# Patient Record
Sex: MALE | Race: WHITE | NOT HISPANIC OR LATINO | Employment: FULL TIME | URBAN - METROPOLITAN AREA
[De-identification: names, ages, dates, MRNs, and addresses within clinical notes are randomized per-mention and may not be internally consistent; named-entity substitution may affect disease eponyms.]

---

## 2018-09-19 ENCOUNTER — HOSPITAL ENCOUNTER (EMERGENCY)
Facility: HOSPITAL | Age: 23
Discharge: HOME | End: 2018-09-19
Attending: EMERGENCY MEDICINE
Payer: COMMERCIAL

## 2018-09-19 VITALS
OXYGEN SATURATION: 100 % | RESPIRATION RATE: 13 BRPM | WEIGHT: 198.31 LBS | DIASTOLIC BLOOD PRESSURE: 83 MMHG | BODY MASS INDEX: 30.05 KG/M2 | HEART RATE: 61 BPM | HEIGHT: 68 IN | SYSTOLIC BLOOD PRESSURE: 137 MMHG | TEMPERATURE: 99 F

## 2018-09-19 DIAGNOSIS — N30.01 ACUTE CYSTITIS WITH HEMATURIA: Primary | ICD-10-CM

## 2018-09-19 DIAGNOSIS — E87.6 HYPOKALEMIA: ICD-10-CM

## 2018-09-19 DIAGNOSIS — R10.9 RIGHT FLANK PAIN: ICD-10-CM

## 2018-09-19 DIAGNOSIS — N23 RENAL COLIC: ICD-10-CM

## 2018-09-19 DIAGNOSIS — N20.1 URETERAL CALCULUS, RIGHT: ICD-10-CM

## 2018-09-19 LAB
ALBUMIN SERPL BCP-MCNC: 4.1 G/DL
ALP SERPL-CCNC: 55 U/L
ALT SERPL W/O P-5'-P-CCNC: 40 U/L
AMORPH CRY UR QL COMP ASSIST: ABNORMAL
ANION GAP SERPL CALC-SCNC: 10 MMOL/L
AST SERPL-CCNC: 49 U/L
BACTERIA #/AREA URNS AUTO: ABNORMAL /HPF
BASOPHILS # BLD AUTO: 0.02 K/UL
BASOPHILS NFR BLD: 0.4 %
BILIRUB SERPL-MCNC: 0.7 MG/DL
BILIRUB UR QL STRIP: NEGATIVE
BUN SERPL-MCNC: 12 MG/DL
CALCIUM SERPL-MCNC: 9 MG/DL
CHLORIDE SERPL-SCNC: 108 MMOL/L
CLARITY UR REFRACT.AUTO: CLEAR
CO2 SERPL-SCNC: 27 MMOL/L
COLOR UR AUTO: YELLOW
CREAT SERPL-MCNC: 0.8 MG/DL
DIFFERENTIAL METHOD: NORMAL
EOSINOPHIL # BLD AUTO: 0.2 K/UL
EOSINOPHIL NFR BLD: 4.1 %
ERYTHROCYTE [DISTWIDTH] IN BLOOD BY AUTOMATED COUNT: 12.7 %
EST. GFR  (AFRICAN AMERICAN): >60 ML/MIN/1.73 M^2
EST. GFR  (NON AFRICAN AMERICAN): >60 ML/MIN/1.73 M^2
GLUCOSE SERPL-MCNC: 108 MG/DL
GLUCOSE UR QL STRIP: NEGATIVE
HCT VFR BLD AUTO: 42.8 %
HGB BLD-MCNC: 14.9 G/DL
HGB UR QL STRIP: ABNORMAL
KETONES UR QL STRIP: NEGATIVE
LEUKOCYTE ESTERASE UR QL STRIP: NEGATIVE
LYMPHOCYTES # BLD AUTO: 2 K/UL
LYMPHOCYTES NFR BLD: 36.5 %
MCH RBC QN AUTO: 30.1 PG
MCHC RBC AUTO-ENTMCNC: 34.8 G/DL
MCV RBC AUTO: 87 FL
MICROSCOPIC COMMENT: ABNORMAL
MONOCYTES # BLD AUTO: 0.7 K/UL
MONOCYTES NFR BLD: 12.5 %
NEUTROPHILS # BLD AUTO: 2.6 K/UL
NEUTROPHILS NFR BLD: 46.3 %
NITRITE UR QL STRIP: NEGATIVE
PH UR STRIP: 7 [PH] (ref 5–8)
PLATELET # BLD AUTO: 218 K/UL
PMV BLD AUTO: 11 FL
POTASSIUM SERPL-SCNC: 3.1 MMOL/L
PROT SERPL-MCNC: 7.2 G/DL
PROT UR QL STRIP: ABNORMAL
RBC # BLD AUTO: 4.95 M/UL
RBC #/AREA URNS AUTO: 25 /HPF (ref 0–4)
SODIUM SERPL-SCNC: 145 MMOL/L
SP GR UR STRIP: 1.02 (ref 1–1.03)
SQUAMOUS #/AREA URNS AUTO: 0 /HPF
URN SPEC COLLECT METH UR: ABNORMAL
UROBILINOGEN UR STRIP-ACNC: ABNORMAL EU/DL
WBC # BLD AUTO: 5.59 K/UL
WBC #/AREA URNS AUTO: 1 /HPF (ref 0–5)
YEAST UR QL AUTO: ABNORMAL

## 2018-09-19 PROCEDURE — 81000 URINALYSIS NONAUTO W/SCOPE: CPT

## 2018-09-19 PROCEDURE — 85025 COMPLETE CBC W/AUTO DIFF WBC: CPT

## 2018-09-19 PROCEDURE — 63600175 PHARM REV CODE 636 W HCPCS: Performed by: NURSE PRACTITIONER

## 2018-09-19 PROCEDURE — 25000003 PHARM REV CODE 250: Performed by: NURSE PRACTITIONER

## 2018-09-19 PROCEDURE — 96374 THER/PROPH/DIAG INJ IV PUSH: CPT

## 2018-09-19 PROCEDURE — 99284 EMERGENCY DEPT VISIT MOD MDM: CPT | Mod: 25

## 2018-09-19 PROCEDURE — 96361 HYDRATE IV INFUSION ADD-ON: CPT

## 2018-09-19 PROCEDURE — 80053 COMPREHEN METABOLIC PANEL: CPT

## 2018-09-19 RX ORDER — TAMSULOSIN HYDROCHLORIDE 0.4 MG/1
0.4 CAPSULE ORAL DAILY
Qty: 10 CAPSULE | Refills: 0 | Status: SHIPPED | OUTPATIENT
Start: 2018-09-19 | End: 2018-09-29

## 2018-09-19 RX ORDER — PREDNISONE 20 MG/1
TABLET ORAL
Qty: 18 TABLET | Refills: 0 | Status: SHIPPED | OUTPATIENT
Start: 2018-09-19

## 2018-09-19 RX ORDER — POTASSIUM CHLORIDE 20 MEQ/1
20 TABLET, EXTENDED RELEASE ORAL
Status: COMPLETED | OUTPATIENT
Start: 2018-09-19 | End: 2018-09-19

## 2018-09-19 RX ORDER — CIPROFLOXACIN 500 MG/1
500 TABLET ORAL
Status: COMPLETED | OUTPATIENT
Start: 2018-09-19 | End: 2018-09-19

## 2018-09-19 RX ORDER — CIPROFLOXACIN 500 MG/1
500 TABLET ORAL 2 TIMES DAILY
Qty: 20 TABLET | Refills: 0 | Status: SHIPPED | OUTPATIENT
Start: 2018-09-19 | End: 2018-09-29

## 2018-09-19 RX ORDER — HYDROCODONE BITARTRATE AND ACETAMINOPHEN 7.5; 325 MG/1; MG/1
1 TABLET ORAL EVERY 6 HOURS PRN
Qty: 15 TABLET | Refills: 0 | Status: SHIPPED | OUTPATIENT
Start: 2018-09-19

## 2018-09-19 RX ORDER — KETOROLAC TROMETHAMINE 30 MG/ML
30 INJECTION, SOLUTION INTRAMUSCULAR; INTRAVENOUS
Status: COMPLETED | OUTPATIENT
Start: 2018-09-19 | End: 2018-09-19

## 2018-09-19 RX ADMIN — CIPROFLOXACIN 500 MG: 500 TABLET, FILM COATED ORAL at 09:09

## 2018-09-19 RX ADMIN — KETOROLAC TROMETHAMINE 30 MG: 30 INJECTION INTRAMUSCULAR; INTRAVENOUS at 08:09

## 2018-09-19 RX ADMIN — POTASSIUM CHLORIDE 20 MEQ: 1500 TABLET, EXTENDED RELEASE ORAL at 09:09

## 2018-09-19 RX ADMIN — SODIUM CHLORIDE 1000 ML: 0.9 INJECTION, SOLUTION INTRAVENOUS at 08:09

## 2018-09-20 NOTE — ED PROVIDER NOTES
Encounter Date: 9/19/2018       History     Chief Complaint   Patient presents with    Flank Pain     c/o right flank pain.sudden onsest 1 hour ago. denies difficulty urinating. hx of stones.        The history is provided by the patient.   Abdominal Pain   The current episode started 1 to 2 hours ago. The onset of the illness was abrupt. The problem has not changed since onset.The abdominal pain is located in the right flank. The abdominal pain does not radiate. The severity of the abdominal pain is 8/10. The abdominal pain is exacerbated by urination. The other symptoms of the illness do not include fever, fatigue, shortness of breath, nausea, vomiting, diarrhea or dysuria.   Associated with: patient has history of kidney stones. The patient has not had a change in bowel habit. Symptoms associated with the illness do not include chills, diaphoresis, heartburn or back pain. Significant associated medical issues do not include GERD, diabetes or gallstones.   Mr. Corona reports having a history of kidney stones.  He states that his first and only episode was approximately one year ago. He states that he received lithotripsy for the treatment of his kidney stone.  He denies any further complaints or concerns.        PCP:   Primary Doctor No          Review of patient's allergies indicates:  No Known Allergies  Past Medical History:   Diagnosis Date    Kidney stones     Renal calculi      Past Surgical History:   Procedure Laterality Date    LITHOTRIPSY  2017    WISDOM TOOTH EXTRACTION       History reviewed. No pertinent family history.  Social History     Tobacco Use    Smoking status: Never Smoker    Smokeless tobacco: Never Used   Substance Use Topics    Alcohol use: No     Frequency: Never    Drug use: No     Review of Systems   Constitutional: Negative for chills, diaphoresis, fatigue and fever.   HENT: Negative for congestion and sore throat.    Respiratory: Negative for cough, chest tightness,  shortness of breath and wheezing.    Cardiovascular: Negative for chest pain and palpitations.   Gastrointestinal: Positive for abdominal pain (right flank). Negative for diarrhea, heartburn, nausea and vomiting.   Genitourinary: Positive for flank pain (right). Negative for difficulty urinating and dysuria.   Musculoskeletal: Negative for back pain.   Skin: Negative for rash.   Neurological: Negative for dizziness, weakness and headaches.   Hematological: Does not bruise/bleed easily.       Physical Exam     Initial Vitals [09/19/18 2007]   BP Pulse Resp Temp SpO2   (!) 156/86 64 18 98.8 °F (37.1 °C) 100 %      MAP       --         Physical Exam    Nursing note and vitals reviewed.  Constitutional: He appears well-developed and well-nourished. He is cooperative. He does not appear ill. He appears distressed (secondary to right flank pain).   HENT:   Head: Normocephalic and atraumatic.   Right Ear: Hearing and external ear normal.   Left Ear: Hearing and external ear normal.   Nose: Nose normal.   Mouth/Throat: Uvula is midline, oropharynx is clear and moist and mucous membranes are normal.   Eyes: Conjunctivae, EOM and lids are normal. Pupils are equal, round, and reactive to light.   Neck: Trachea normal and normal range of motion. Neck supple.   Cardiovascular: Normal rate, regular rhythm, intact distal pulses and normal pulses.   Pulmonary/Chest: Effort normal and breath sounds normal. No accessory muscle usage. No respiratory distress. He has no decreased breath sounds. He has no wheezes. He has no rhonchi. He has no rales.   Abdominal: Soft. Bowel sounds are normal. He exhibits no distension and no mass. There is tenderness (right flank). There is no rigidity, no rebound, no guarding and no CVA tenderness.   Musculoskeletal: Normal range of motion. He exhibits no edema.   Neurological: He is alert and oriented to person, place, and time. He has normal strength. No sensory deficit. Gait normal. GCS eye subscore  "is 4. GCS verbal subscore is 5. GCS motor subscore is 6.   Neurovascular intact to all extremities.    Skin: Skin is warm, dry and intact. Capillary refill takes less than 2 seconds. No rash noted.   Normal color and turgor.    Psychiatric: He has a normal mood and affect. His speech is normal and behavior is normal. Cognition and memory are normal.         ED Course   Procedures    ED ONGOING VITALS:  Vitals:    09/19/18 2007 09/19/18 2145 09/19/18 2206   BP: (!) 156/86 (!) 140/76 137/83   Pulse: 64 66 61   Resp: 18 18 13   Temp: 98.8 °F (37.1 °C) 98.7 °F (37.1 °C)    TempSrc: Oral     SpO2: 100% 100% 100%   Weight: 89.9 kg (198 lb 4.9 oz)     Height: 5' 8" (1.727 m)           ABNORMAL LAB VALUES:  Labs Reviewed   COMPREHENSIVE METABOLIC PANEL - Abnormal; Notable for the following components:       Result Value    Potassium 3.1 (*)     AST 49 (*)     All other components within normal limits   URINALYSIS, REFLEX TO URINE CULTURE - Abnormal; Notable for the following components:    Protein, UA Trace (*)     Occult Blood UA 3+ (*)     Urobilinogen, UA 4.0-6.0 (*)     All other components within normal limits    Narrative:     Preferred Collection Type->Urine, Clean Catch   URINALYSIS MICROSCOPIC - Abnormal; Notable for the following components:    RBC, UA 25 (*)     Bacteria, UA Moderate (*)     Amorphous, UA Many (*)     All other components within normal limits    Narrative:     Preferred Collection Type->Urine, Clean Catch   CBC W/ AUTO DIFFERENTIAL         ALL LAB VALUES:  Results for orders placed or performed during the hospital encounter of 09/19/18   CBC auto differential   Result Value Ref Range    WBC 5.59 3.90 - 12.70 K/uL    RBC 4.95 4.60 - 6.20 M/uL    Hemoglobin 14.9 14.0 - 18.0 g/dL    Hematocrit 42.8 40.0 - 54.0 %    MCV 87 82 - 98 fL    MCH 30.1 27.0 - 31.0 pg    MCHC 34.8 32.0 - 36.0 g/dL    RDW 12.7 11.5 - 14.5 %    Platelets 218 150 - 350 K/uL    MPV 11.0 9.2 - 12.9 fL    Gran # (ANC) 2.6 1.8 - " 7.7 K/uL    Lymph # 2.0 1.0 - 4.8 K/uL    Mono # 0.7 0.3 - 1.0 K/uL    Eos # 0.2 0.0 - 0.5 K/uL    Baso # 0.02 0.00 - 0.20 K/uL    Gran% 46.3 38.0 - 73.0 %    Lymph% 36.5 18.0 - 48.0 %    Mono% 12.5 4.0 - 15.0 %    Eosinophil% 4.1 0.0 - 8.0 %    Basophil% 0.4 0.0 - 1.9 %    Differential Method Automated    Comprehensive metabolic panel   Result Value Ref Range    Sodium 145 136 - 145 mmol/L    Potassium 3.1 (L) 3.5 - 5.1 mmol/L    Chloride 108 95 - 110 mmol/L    CO2 27 23 - 29 mmol/L    Glucose 108 70 - 110 mg/dL    BUN, Bld 12 6 - 20 mg/dL    Creatinine 0.8 0.5 - 1.4 mg/dL    Calcium 9.0 8.7 - 10.5 mg/dL    Total Protein 7.2 6.0 - 8.4 g/dL    Albumin 4.1 3.5 - 5.2 g/dL    Total Bilirubin 0.7 0.1 - 1.0 mg/dL    Alkaline Phosphatase 55 55 - 135 U/L    AST 49 (H) 10 - 40 U/L    ALT 40 10 - 44 U/L    Anion Gap 10 8 - 16 mmol/L    eGFR if African American >60.0 >60 mL/min/1.73 m^2    eGFR if non African American >60.0 >60 mL/min/1.73 m^2   Urinalysis, Reflex to Urine Culture Urine, Clean Catch   Result Value Ref Range    Specimen UA Urine, Clean Catch     Color, UA Yellow Yellow, Straw, Janneth    Appearance, UA Clear Clear    pH, UA 7.0 5.0 - 8.0    Specific Gravity, UA 1.020 1.005 - 1.030    Protein, UA Trace (A) Negative    Glucose, UA Negative Negative    Ketones, UA Negative Negative    Bilirubin (UA) Negative Negative    Occult Blood UA 3+ (A) Negative    Nitrite, UA Negative Negative    Urobilinogen, UA 4.0-6.0 (A) <2.0 EU/dL    Leukocytes, UA Negative Negative   Urinalysis Microscopic   Result Value Ref Range    RBC, UA 25 (H) 0 - 4 /hpf    WBC, UA 1 0 - 5 /hpf    Bacteria, UA Moderate (A) None-Occ /hpf    Yeast, UA None None    Squam Epithel, UA 0 /hpf    Amorphous, UA Many (A) None-Moderate    Microscopic Comment SEE COMMENT          RADIOLOGY STUDIES:  Imaging Results          CT Renal Stone Study ABD Pelvis WO (Final result)  Result time 09/19/18 21:15:53    Final result by Brent Syed MD (09/19/18 21:15:53)                  Impression:      Mild right hydronephrosis secondary to a 1 mm right proximal ureteral calculus.    All CT scans at this facility are performed  using dose modulation techniques as appropriate to performed exam including the following:  automated exposure control; adjustment of mA and/or kV according to the patients size (this includes techniques or standardized protocols for targeted exams where dose is matched to indication/reason for exam: i.e. extremities or head);  iterative reconstruction technique.      Electronically signed by: Brent Syed MD  Date:    09/19/2018  Time:    21:15             Narrative:    EXAMINATION:  CT RENAL STONE STUDY ABD PELVIS WO    CLINICAL HISTORY:  Flank pain, stone disease suspected;    TECHNIQUE:  Axial CT images performed through the abdomen and pelvis without intravenous contrast. Multiplanar reformats were performed and interpreted.    COMPARISON:  None    FINDINGS:  Lung bases are clear.    Mild right hydronephrosis secondary to a 1 mm right proximal ureteral calculus.    The left kidney is within normal limits.    The liver, spleen, adrenal glands, and pancreas have a normal noncontrasted appearance.    The gallbladder is unremarkable.    No free fluid, free air, or inflammatory change.    The bowel is nondistended and within normal limits.  The appendix is within normal limits.    The abdominal aorta is normal in caliber.    The urinary bladder is unremarkable.    No significant osseous abnormality is identified.                                  The above vital signs and test results have been reviewed by the emergency provider.       ED MEDICATIONS:  Medications   sodium chloride 0.9% bolus 1,000 mL (0 mLs Intravenous Stopped 9/19/18 2159)   ketorolac injection 30 mg (30 mg Intravenous Given 9/19/18 2038)   ciprofloxacin HCl tablet 500 mg (500 mg Oral Given 9/19/18 2147)   potassium chloride SA CR tablet 20 mEq (20 mEq Oral Given 9/19/18 2147)           ED  EVENTS:    2050 HOURS  RE-EVALUATION:  The patient is resting comfortably and is in no acute distress. Discussed test results and notified of pending labs. He states that he is feeling better and rates his pain as a 5/10 following administration of the Toradol.  Answered questions at this time.       2155 HOURS RE-EVALUATION & DISPOSITION:   Reassessment at the time of disposition demonstrates that the patient is resting comfortably in no acute distress. Mr. Corona states that he is feeling much better and rates his level of discomfort as a 1/10 upon discharge.  He has remained hemodynamically stable throughout the entire ED visit and is without objective evidence for acute process requiring urgent intervention or hospitalization. I discussed test results and provided counseling to patient with regard to condition, the treatment plan, specific conditions for return, and the importance of follow up. Advised Mr. Corona to avoid driving or operating heavy machinery after taking pain medications.   Answered questions at this time. The patient is stable for discharge.                     Medical Decision Making:   Clinical Tests:   Lab Tests: Ordered and Reviewed  Radiological Study: Ordered and Reviewed                      Clinical Impression:       ICD-10-CM ICD-9-CM   1. Acute cystitis with hematuria N30.01 595.0   2. Renal colic N23 788.0   3. Right flank pain R10.9 789.09   4. Ureteral calculus, right N20.1 592.1   5. Hypokalemia E87.6 276.8           Disposition:   Disposition: Discharged  Condition: Stable  I discussed with patient that the evaluation in the emergency department does not suggest any emergent or life threatening medical condition requiring immediate intervention beyond what was provided in the ED, and I believe patient is safe for discharge.  Regardless, an unremarkable evaluation in the ED does not preclude the development or presence of a serious of life threatening condition. As such, patient  was instructed to return immediately for any worsening or change in current symptoms. I also discussed the results of my evaluation and diagnostics with patient and he concurs with the evaluation and management plan.  Detailed written and verbal instructions provided to patient and he expressed a verbal understanding of the discharge instructions and overall management plan. Reiterated the importance of medication administration and safety and advised patient to follow up with primary care provider in 3-5 days or sooner if needed.  Also advised patient to return to the ER for any complications.     Regarding KIDNEY STONES, I instructed patient to: drink as much water as possible to increase urination and the possibility of passing any stone fragments ; return to normal activity unless taking pain medications as narcotics may affect one's judgment or reflexes;  call primary care provider or urologist if still experiencing pain; collect stone or fragment and bring it to primary care provider or urologist so it can be analyzed; continue taking routine medications unless advised differently; and to contact primary care provider or urologist for any questions or concerns regarding the condition or treatment plan.  Patient was educated on etiology of kidney stones (i.e., high concentrations of calcium, oxalate, and phosphorus) and the different types of kidney stones (i.e., calcium stones, uric acid stones, struvite stones, and cystine stones).  For prevention of kidney stones, reiterated to patient that drinking enough fluid is the most important thing a person can do to prevent kidney stones and that one should drink enough water and other fluids to make at least 2 liters of urine a day.  I also recommended that patient reduce sodium intake to reduce calcium excretion via urinary system and  limit intake of purines (i.e., meats and animal proteins).     For URINARY TRACT INFECTION, I instructed patient to avoid holding  in urine and recommended that he urinate when he feels the urge.  Also advised patient to drink plenty of liquids and reminded patient that he may need to drink more fluids than usual to help flush out the bacteria. Instructed patient to avoid alcohol, caffeine, and citrus juices as these substances can irritate the bladder and increase symptoms.  Also recommended that patient apply heat to lower abdomen for 20 to 30 minutes every two hours to help decrease discomfort and pressure in the bladder.    Regarding HYPOKALEMIA, I educated patient on the importance of potassium and explained that potassium helps with the function of nerve and muscle cells, including those in the heart. I informed patient that low level of potassium in the blood can cause abnormal heart rhythms and even heart attack. I instructed patient to eat more potassium-rich foods such as: bananas; oranges and orange juice; tomatoes, tomato sauce, and tomato juice; green, leafy vegetables - including spinach, salad greens, collards, and chard; melons (all kinds); peas; beans; potatoes; sweet potatoes; avocados, including guacamole; vegetable juices; fruit juices; and all nuts and seeds. Also instructed patient to avoid food that are high in salt and to have potassium levels checked regularly by primary care provider.  Advised patient to contact primary care provider for any of the following symptoms: vomiting, fatigue, diarrhea,  rapid (or irregular) heartbeat, shortness of breath, chest pain, muscle cramps (including spasms or twitching), weakness, and paralysis.                 Follow-up Information     Call  Luis Manuel Narvaez MD.    Specialty:  Family Medicine  Why:  To obtain a primary care provider  Contact information:  [id number] [address]  [place] [address]  [phone]             Go to  [facility].    Specialty:  Emergency Medicine  Why:  As needed or if symptoms worsen  Contact information:  [address] [address]  [place]  [place] [address]  [phone]                              Trae Ragland, CLARENCE  09/23/18 2102

## 2018-09-20 NOTE — DISCHARGE INSTRUCTIONS
Do not drive or operate heavy machinery after taking pain medication.     Only take the FLOMAX & PREDNISONE as prescribed until the stone passes.